# Patient Record
Sex: MALE | Race: WHITE | NOT HISPANIC OR LATINO | URBAN - METROPOLITAN AREA
[De-identification: names, ages, dates, MRNs, and addresses within clinical notes are randomized per-mention and may not be internally consistent; named-entity substitution may affect disease eponyms.]

---

## 2018-10-14 ENCOUNTER — EMERGENCY (EMERGENCY)
Facility: HOSPITAL | Age: 63
LOS: 1 days | Discharge: HOME | End: 2018-10-14
Attending: EMERGENCY MEDICINE | Admitting: EMERGENCY MEDICINE
Payer: COMMERCIAL

## 2018-10-14 VITALS
DIASTOLIC BLOOD PRESSURE: 102 MMHG | HEIGHT: 68 IN | RESPIRATION RATE: 16 BRPM | OXYGEN SATURATION: 99 % | SYSTOLIC BLOOD PRESSURE: 153 MMHG | WEIGHT: 197.09 LBS | TEMPERATURE: 98 F | HEART RATE: 80 BPM

## 2018-10-14 VITALS
TEMPERATURE: 98 F | OXYGEN SATURATION: 95 % | SYSTOLIC BLOOD PRESSURE: 157 MMHG | HEART RATE: 81 BPM | RESPIRATION RATE: 16 BRPM | DIASTOLIC BLOOD PRESSURE: 86 MMHG

## 2018-10-14 PROCEDURE — 70486 CT MAXILLOFACIAL W/O DYE: CPT | Mod: 26

## 2018-10-14 PROCEDURE — 70450 CT HEAD/BRAIN W/O DYE: CPT | Mod: 26

## 2018-10-14 PROCEDURE — 70486 CT MAXILLOFACIAL W/O DYE: CPT

## 2018-10-14 PROCEDURE — 73130 X-RAY EXAM OF HAND: CPT | Mod: 26,LT

## 2018-10-14 PROCEDURE — 70450 CT HEAD/BRAIN W/O DYE: CPT

## 2018-10-14 PROCEDURE — 73130 X-RAY EXAM OF HAND: CPT

## 2018-10-14 PROCEDURE — 99284 EMERGENCY DEPT VISIT MOD MDM: CPT | Mod: 25

## 2018-10-14 PROCEDURE — 99284 EMERGENCY DEPT VISIT MOD MDM: CPT

## 2018-10-14 RX ORDER — METOPROLOL TARTRATE 50 MG
0 TABLET ORAL
Qty: 0 | Refills: 0 | COMMUNITY

## 2018-10-14 RX ORDER — CANDESARTAN CILEXETIL 8 MG/1
0 TABLET ORAL
Qty: 0 | Refills: 0 | COMMUNITY

## 2018-10-14 RX ORDER — WARFARIN SODIUM 2.5 MG/1
1 TABLET ORAL
Qty: 0 | Refills: 0 | COMMUNITY

## 2018-10-14 RX ORDER — ACETAMINOPHEN 500 MG
650 TABLET ORAL ONCE
Qty: 0 | Refills: 0 | Status: COMPLETED | OUTPATIENT
Start: 2018-10-14 | End: 2018-10-14

## 2018-10-14 RX ORDER — WARFARIN SODIUM 2.5 MG/1
12.5 TABLET ORAL
Qty: 0 | Refills: 0 | COMMUNITY

## 2018-10-14 RX ORDER — POTASSIUM CHLORIDE 20 MEQ
0 PACKET (EA) ORAL
Qty: 0 | Refills: 0 | COMMUNITY

## 2018-10-14 RX ORDER — CARBAMAZEPINE 200 MG
400 TABLET ORAL
Qty: 0 | Refills: 0 | COMMUNITY

## 2018-10-14 RX ADMIN — Medication 650 MILLIGRAM(S): at 21:49

## 2018-10-14 NOTE — ED ADULT NURSE NOTE - NSIMPLEMENTINTERV_GEN_ALL_ED
Implemented All Fall Risk Interventions:  [place] to call system. Call bell, personal items and telephone within reach. Instruct patient to call for assistance. Room bathroom lighting operational. Non-slip footwear when patient is off stretcher. Physically safe environment: no spills, clutter or unnecessary equipment. Stretcher in lowest position, wheels locked, appropriate side rails in place. Provide visual cue, wrist band, yellow gown, etc. Monitor gait and stability. Monitor for mental status changes and reorient to person, place, and time. Review medications for side effects contributing to fall risk. Reinforce activity limits and safety measures with patient and family.

## 2018-10-14 NOTE — ED PROVIDER NOTE - MEDICAL DECISION MAKING DETAILS
64 y/o M with hx of PE on coumadin with L periorbital pain/swelling/bruising s/p trip and fall on concrete today, no loc, no neuro deficits, will check inr, ct head and facial bones, pain meds, re-assess

## 2018-10-14 NOTE — ED PROVIDER NOTE - ATTENDING CONTRIBUTION TO CARE
Heron Modi MD: I have personally performed a face to face diagnostic evaluation on this patient.  I have reviewed the PA note and agree with the history, exam, and plan of care, except as noted.  History and Exam by me shows same findings as documented  Attending Note: Patient s/p fall. Left periorbital swelling. Normal neuro exam. Agree with plan

## 2018-10-14 NOTE — ED PROVIDER NOTE - OBJECTIVE STATEMENT
64 y/o M with hx of HTN, seizure d/o, PE, DVT on coumadin presents with c/o L facial pain s/p fall today. Pt states that he tripped on the concrete at the [employer] fest and fell forward on his face and his palms around 8pm today. Pt c/o pain with swelling around his L eye and abrasions to his L palm with mild pain. Pt is L hand dominant. Denies LOC, dizziness, headache, vision changes, numbness, tingling, focal weakness, neck/back/hip pain, cp, sob, other injuries/symptoms. Pt had his last INR checked 3 weeks and was 2.1

## 2018-10-14 NOTE — ED PROVIDER NOTE - ENMT, MLM
+racoons eye L/swelling with bruising and ttp to L lower eyelid/periorbital region with abrasions, Airway patent, Nasal mucosa clear. Mouth with normal mucosa. Throat has no vesicles, no oropharyngeal exudates and uvula is midline. no hemotympanum B

## 2018-10-14 NOTE — ED ADULT NURSE NOTE - OBJECTIVE STATEMENT
pt walked, s/p mechanical fall 45 minutes PTA, pt states he fell face down, trying to break the fall with left arm, now c/o left palm pain . +bruising noted under left eye, bruising to left side of face. bruising to left palm and right 4th finger. pt admits drinking couple of drinks today.

## 2018-10-14 NOTE — ED PROVIDER NOTE - CHPI ED SYMPTOMS POS
ABRASION/BRUISING/PAIN/pain with bruising and swelling around L eye, fall, on coumadin, L hand pain/abrasions